# Patient Record
Sex: FEMALE | Race: WHITE | HISPANIC OR LATINO | URBAN - METROPOLITAN AREA
[De-identification: names, ages, dates, MRNs, and addresses within clinical notes are randomized per-mention and may not be internally consistent; named-entity substitution may affect disease eponyms.]

---

## 2017-06-16 ENCOUNTER — APPOINTMENT (OUTPATIENT)
Age: 77
Setting detail: DERMATOLOGY
End: 2017-06-18

## 2017-06-16 VITALS
HEART RATE: 57 BPM | HEIGHT: 58.5 IN | DIASTOLIC BLOOD PRESSURE: 73 MMHG | SYSTOLIC BLOOD PRESSURE: 136 MMHG | WEIGHT: 117 LBS

## 2017-06-16 DIAGNOSIS — T88.7XX: ICD-10-CM

## 2017-06-16 PROBLEM — T88.7XXA UNSPECIFIED ADVERSE EFFECT OF DRUG OR MEDICAMENT, INITIAL ENCOUNTER: Status: ACTIVE | Noted: 2017-06-16

## 2017-06-16 PROCEDURE — OTHER PATIENT SPECIFIC COUNSELING: OTHER

## 2017-06-16 PROCEDURE — 99202 OFFICE O/P NEW SF 15 MIN: CPT

## 2017-06-16 PROCEDURE — OTHER COUNSELING: OTHER

## 2017-06-16 ASSESSMENT — LOCATION SIMPLE DESCRIPTION DERM
LOCATION SIMPLE: LEFT CHEEK
LOCATION SIMPLE: RIGHT CHEEK

## 2017-06-16 ASSESSMENT — LOCATION DETAILED DESCRIPTION DERM
LOCATION DETAILED: RIGHT INFERIOR CENTRAL MALAR CHEEK
LOCATION DETAILED: LEFT INFERIOR CENTRAL MALAR CHEEK

## 2017-06-16 ASSESSMENT — LOCATION ZONE DERM: LOCATION ZONE: FACE

## 2017-06-16 NOTE — PROCEDURE: PATIENT SPECIFIC COUNSELING
Detail Level: Detailed
Discussed with patient that her reaction could have in fact been caused by previous imiqumoid use on the anal mucosa as may have had systemic absorption.  Should use caution with imiquimod. If necessary to use should only use it on specific lesions and not in a generalized manner. . Contact office immediately if condition flares.

## 2017-06-16 NOTE — HPI: RASH
How Severe Is Your Rash?: mild
Is This A New Presentation, Or A Follow-Up?: Rash
Additional History: Patient went to Emergency Department when rash and symptoms began where she was prescribed Hydrocortisone cream. She then saw Dr. 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 After a few days, patient's lymph nodes became inflamed, face was still inflamed, and had fluid build up in ears. She went to see Dr. Mcmillan (PCP), and was prescribed Prednisone and Clindamycin. Since then, condition has improved but is still present at this time.